# Patient Record
Sex: FEMALE | Race: WHITE | NOT HISPANIC OR LATINO
[De-identification: names, ages, dates, MRNs, and addresses within clinical notes are randomized per-mention and may not be internally consistent; named-entity substitution may affect disease eponyms.]

---

## 2019-03-12 ENCOUNTER — RECORD ABSTRACTING (OUTPATIENT)
Age: 74
End: 2019-03-12

## 2019-03-12 DIAGNOSIS — Z86.79 PERSONAL HISTORY OF OTHER DISEASES OF THE CIRCULATORY SYSTEM: ICD-10-CM

## 2019-03-12 DIAGNOSIS — Z83.438 FAMILY HISTORY OF OTHER DISORDER OF LIPOPROTEIN METABOLISM AND OTHER LIPIDEMIA: ICD-10-CM

## 2019-03-12 DIAGNOSIS — Z80.1 FAMILY HISTORY OF MALIGNANT NEOPLASM OF TRACHEA, BRONCHUS AND LUNG: ICD-10-CM

## 2019-03-12 DIAGNOSIS — Z86.39 PERSONAL HISTORY OF OTHER ENDOCRINE, NUTRITIONAL AND METABOLIC DISEASE: ICD-10-CM

## 2019-03-12 DIAGNOSIS — M50.90 CERVICAL DISC DISORDER, UNSPECIFIED, UNSPECIFIED CERVICAL REGION: ICD-10-CM

## 2019-03-12 DIAGNOSIS — Z80.49 FAMILY HISTORY OF MALIGNANT NEOPLASM OF OTHER GENITAL ORGANS: ICD-10-CM

## 2019-03-19 ENCOUNTER — APPOINTMENT (OUTPATIENT)
Dept: INTERNAL MEDICINE | Facility: CLINIC | Age: 74
End: 2019-03-19
Payer: COMMERCIAL

## 2019-03-19 VITALS
HEART RATE: 72 BPM | SYSTOLIC BLOOD PRESSURE: 146 MMHG | HEIGHT: 62.75 IN | BODY MASS INDEX: 27.81 KG/M2 | WEIGHT: 155 LBS | TEMPERATURE: 97.9 F | DIASTOLIC BLOOD PRESSURE: 70 MMHG

## 2019-03-19 DIAGNOSIS — Z00.00 ENCOUNTER FOR GENERAL ADULT MEDICAL EXAMINATION W/OUT ABNORMAL FINDINGS: ICD-10-CM

## 2019-03-19 PROCEDURE — G0444 DEPRESSION SCREEN ANNUAL: CPT

## 2019-03-19 PROCEDURE — 99204 OFFICE O/P NEW MOD 45 MIN: CPT | Mod: 25

## 2019-03-19 PROCEDURE — G0442 ANNUAL ALCOHOL SCREEN 15 MIN: CPT

## 2019-03-19 RX ORDER — ASPIRIN 325 MG/1
325 TABLET, FILM COATED ORAL
Refills: 0 | Status: DISCONTINUED | COMMUNITY
End: 2019-03-19

## 2019-03-19 NOTE — HEALTH RISK ASSESSMENT
[No falls in past year] : Patient reported no falls in the past year [Patient reported mammogram was normal] : Patient reported mammogram was normal [Patient reported PAP Smear was normal] : Patient reported PAP Smear was normal [Patient reported bone density results were abnormal] : Patient reported bone density results were abnormal [Patient declined colonoscopy] : Patient declined colonoscopy [] : No [MammogramDate] : 08/2018 [BoneDensityDate] : 2018 [PapSmearDate] : 2016 [BoneDensityComments] : osteopenia.

## 2019-03-19 NOTE — HISTORY OF PRESENT ILLNESS
[FreeTextEntry1] : numbness and tingling in feet.  [de-identified] : neuropathy diagnosed by neurologist. hands and feet.  \par \par chronic a fib. \par \par \par feel tired a lot. \par   \par \par did pt for neuropathy.   finds that has balance difficulty. \par \par black dot on great toe left foot.  \par

## 2019-04-09 ENCOUNTER — RESULT REVIEW (OUTPATIENT)
Age: 74
End: 2019-04-09

## 2019-04-23 ENCOUNTER — APPOINTMENT (OUTPATIENT)
Dept: INTERNAL MEDICINE | Facility: CLINIC | Age: 74
End: 2019-04-23

## 2019-05-15 ENCOUNTER — APPOINTMENT (OUTPATIENT)
Dept: INTERNAL MEDICINE | Facility: CLINIC | Age: 74
End: 2019-05-15
Payer: COMMERCIAL

## 2019-05-15 VITALS
DIASTOLIC BLOOD PRESSURE: 70 MMHG | SYSTOLIC BLOOD PRESSURE: 132 MMHG | HEIGHT: 63 IN | TEMPERATURE: 97.9 F | WEIGHT: 153 LBS | HEART RATE: 67 BPM | BODY MASS INDEX: 27.11 KG/M2 | RESPIRATION RATE: 16 BRPM | OXYGEN SATURATION: 96 %

## 2019-05-15 PROCEDURE — 99214 OFFICE O/P EST MOD 30 MIN: CPT

## 2019-05-15 RX ORDER — PRAVASTATIN SODIUM 40 MG/1
40 TABLET ORAL DAILY
Refills: 0 | Status: DISCONTINUED | COMMUNITY
End: 2019-05-15

## 2019-05-15 NOTE — PHYSICAL EXAM
[No Acute Distress] : no acute distress [Well Developed] : well developed [Well Nourished] : well nourished [Well-Appearing] : well-appearing [Normal Sclera/Conjunctiva] : normal sclera/conjunctiva [PERRL] : pupils equal round and reactive to light [EOMI] : extraocular movements intact [Normal Outer Ear/Nose] : the outer ears and nose were normal in appearance [No JVD] : no jugular venous distention [Normal Oropharynx] : the oropharynx was normal [Supple] : supple [No Lymphadenopathy] : no lymphadenopathy [Thyroid Normal, No Nodules] : the thyroid was normal and there were no nodules present [No Respiratory Distress] : no respiratory distress  [Clear to Auscultation] : lungs were clear to auscultation bilaterally [Normal Rate] : normal rate  [No Accessory Muscle Use] : no accessory muscle use [Normal S1, S2] : normal S1 and S2 [Regular Rhythm] : with a regular rhythm [No Murmur] : no murmur heard [No Carotid Bruits] : no carotid bruits [No Abdominal Bruit] : a ~M bruit was not heard ~T in the abdomen [No Varicosities] : no varicosities [Pedal Pulses Present] : the pedal pulses are present [No Edema] : there was no peripheral edema [No Extremity Clubbing/Cyanosis] : no extremity clubbing/cyanosis [No Palpable Aorta] : no palpable aorta [Non Tender] : non-tender [Non-distended] : non-distended [Soft] : abdomen soft [No HSM] : no HSM [No Masses] : no abdominal mass palpated [Normal Bowel Sounds] : normal bowel sounds [Normal Posterior Cervical Nodes] : no posterior cervical lymphadenopathy [Normal Anterior Cervical Nodes] : no anterior cervical lymphadenopathy [No Spinal Tenderness] : no spinal tenderness [No Joint Swelling] : no joint swelling [No CVA Tenderness] : no CVA  tenderness [No Rash] : no rash [Grossly Normal Strength/Tone] : grossly normal strength/tone [Coordination Grossly Intact] : coordination grossly intact [No Focal Deficits] : no focal deficits [Normal Gait] : normal gait [Deep Tendon Reflexes (DTR)] : deep tendon reflexes were 2+ and symmetric [Normal Affect] : the affect was normal [Normal Insight/Judgement] : insight and judgment were intact

## 2019-05-15 NOTE — HISTORY OF PRESENT ILLNESS
[FreeTextEntry1] : thyroid.  [de-identified] : us thyroid,   2 years ago. \par \par saw endocrinologist,  ordered new us. \par \par cyst on left side. \par \par has been on armour thyroid for the last 5 years.    said that levothyroxine "made her nervous.".  \par \par cardiologist just   increased pravastatin  from 20 to 40 and patient feels that "ascending paralysis"   got work. \par patient stopped pravastatin on her own accord.   \par \par cardiologist has prescribed a different statin. \par \par We reviewed her DEXA from August of 2017. She has osteopenia. Repeat DEXA August 2019. Reviewed mammogram and ultrasound the breast.

## 2019-05-20 ENCOUNTER — RESULT REVIEW (OUTPATIENT)
Age: 74
End: 2019-05-20

## 2019-05-22 ENCOUNTER — APPOINTMENT (OUTPATIENT)
Dept: CARDIOLOGY | Facility: CLINIC | Age: 74
End: 2019-05-22

## 2019-07-12 ENCOUNTER — APPOINTMENT (OUTPATIENT)
Dept: SURGERY | Facility: CLINIC | Age: 74
End: 2019-07-12

## 2019-08-02 ENCOUNTER — APPOINTMENT (OUTPATIENT)
Dept: INTERNAL MEDICINE | Facility: CLINIC | Age: 74
End: 2019-08-02
Payer: COMMERCIAL

## 2019-08-02 VITALS
HEART RATE: 86 BPM | DIASTOLIC BLOOD PRESSURE: 64 MMHG | HEIGHT: 63 IN | OXYGEN SATURATION: 98 % | SYSTOLIC BLOOD PRESSURE: 138 MMHG | TEMPERATURE: 98.1 F

## 2019-08-02 DIAGNOSIS — T50.905A ADVERSE EFFECT OF UNSPECIFIED DRUGS, MEDICAMENTS AND BIOLOGICAL SUBSTANCES, INITIAL ENCOUNTER: ICD-10-CM

## 2019-08-02 PROCEDURE — 99214 OFFICE O/P EST MOD 30 MIN: CPT

## 2019-08-02 RX ORDER — THYROID, PORCINE 15 MG/1
15 TABLET ORAL DAILY
Refills: 0 | Status: DISCONTINUED | COMMUNITY
End: 2019-08-02

## 2019-08-02 NOTE — PHYSICAL EXAM
[No Acute Distress] : no acute distress [Well Developed] : well developed [Well Nourished] : well nourished [Well-Appearing] : well-appearing [Normal Sclera/Conjunctiva] : normal sclera/conjunctiva [PERRL] : pupils equal round and reactive to light [EOMI] : extraocular movements intact [Normal Outer Ear/Nose] : the outer ears and nose were normal in appearance [No JVD] : no jugular venous distention [Normal Oropharynx] : the oropharynx was normal [Supple] : supple [No Lymphadenopathy] : no lymphadenopathy [Thyroid Normal, No Nodules] : the thyroid was normal and there were no nodules present [No Accessory Muscle Use] : no accessory muscle use [No Respiratory Distress] : no respiratory distress  [Normal Rate] : normal rate  [Clear to Auscultation] : lungs were clear to auscultation bilaterally [No Murmur] : no murmur heard [Normal S1, S2] : normal S1 and S2 [Regular Rhythm] : with a regular rhythm [No Abdominal Bruit] : a ~M bruit was not heard ~T in the abdomen [No Carotid Bruits] : no carotid bruits [Pedal Pulses Present] : the pedal pulses are present [No Varicosities] : no varicosities [No Extremity Clubbing/Cyanosis] : no extremity clubbing/cyanosis [No Edema] : there was no peripheral edema [No Palpable Aorta] : no palpable aorta [Soft] : abdomen soft [Non Tender] : non-tender [No Masses] : no abdominal mass palpated [Non-distended] : non-distended [No HSM] : no HSM [Normal Bowel Sounds] : normal bowel sounds [Normal Posterior Cervical Nodes] : no posterior cervical lymphadenopathy [No CVA Tenderness] : no CVA  tenderness [No Spinal Tenderness] : no spinal tenderness [Normal Anterior Cervical Nodes] : no anterior cervical lymphadenopathy [No Joint Swelling] : no joint swelling [Grossly Normal Strength/Tone] : grossly normal strength/tone [Coordination Grossly Intact] : coordination grossly intact [No Rash] : no rash [No Focal Deficits] : no focal deficits [Normal Gait] : normal gait [Deep Tendon Reflexes (DTR)] : deep tendon reflexes were 2+ and symmetric [Normal Insight/Judgement] : insight and judgment were intact [Normal Affect] : the affect was normal

## 2019-08-02 NOTE — HISTORY OF PRESENT ILLNESS
[FreeTextEntry1] : got 3 doses of iv ig last week.       [de-identified] : as per neurologist,      got 3 doses of ivig last week.    \par \par patient got very hot during the treatment.\par \par projectile vomiting.\par \par visiting nurse gave the treatments at home.\par \par \par took antinausea medicines.\par \par fatigue, weakness,  lightheaded,   no fever.  \par \par chills.\par \par

## 2019-08-02 NOTE — HISTORY OF PRESENT ILLNESS
[FreeTextEntry1] : got 3 doses of iv ig last week.       [de-identified] : as per neurologist,      got 3 doses of ivig last week.    \par \par patient got very hot during the treatment.\par \par projectile vomiting.\par \par visiting nurse gave the treatments at home.\par \par \par took antinausea medicines.\par \par fatigue, weakness,  lightheaded,   no fever.  \par \par chills.\par \par

## 2019-08-02 NOTE — PHYSICAL EXAM
[No Acute Distress] : no acute distress [Well Nourished] : well nourished [Well Developed] : well developed [Normal Sclera/Conjunctiva] : normal sclera/conjunctiva [Well-Appearing] : well-appearing [Normal Outer Ear/Nose] : the outer ears and nose were normal in appearance [PERRL] : pupils equal round and reactive to light [EOMI] : extraocular movements intact [No JVD] : no jugular venous distention [Normal Oropharynx] : the oropharynx was normal [Supple] : supple [No Lymphadenopathy] : no lymphadenopathy [No Respiratory Distress] : no respiratory distress  [Thyroid Normal, No Nodules] : the thyroid was normal and there were no nodules present [No Accessory Muscle Use] : no accessory muscle use [Normal Rate] : normal rate  [Clear to Auscultation] : lungs were clear to auscultation bilaterally [Normal S1, S2] : normal S1 and S2 [No Murmur] : no murmur heard [Regular Rhythm] : with a regular rhythm [No Abdominal Bruit] : a ~M bruit was not heard ~T in the abdomen [No Carotid Bruits] : no carotid bruits [No Varicosities] : no varicosities [Pedal Pulses Present] : the pedal pulses are present [No Extremity Clubbing/Cyanosis] : no extremity clubbing/cyanosis [No Edema] : there was no peripheral edema [No Palpable Aorta] : no palpable aorta [Non Tender] : non-tender [Soft] : abdomen soft [Non-distended] : non-distended [No Masses] : no abdominal mass palpated [No HSM] : no HSM [Normal Bowel Sounds] : normal bowel sounds [Normal Posterior Cervical Nodes] : no posterior cervical lymphadenopathy [Normal Anterior Cervical Nodes] : no anterior cervical lymphadenopathy [No CVA Tenderness] : no CVA  tenderness [No Spinal Tenderness] : no spinal tenderness [Grossly Normal Strength/Tone] : grossly normal strength/tone [No Joint Swelling] : no joint swelling [No Focal Deficits] : no focal deficits [No Rash] : no rash [Coordination Grossly Intact] : coordination grossly intact [Normal Gait] : normal gait [Deep Tendon Reflexes (DTR)] : deep tendon reflexes were 2+ and symmetric [Normal Insight/Judgement] : insight and judgment were intact [Normal Affect] : the affect was normal

## 2019-08-22 ENCOUNTER — TRANSCRIPTION ENCOUNTER (OUTPATIENT)
Age: 74
End: 2019-08-22

## 2019-09-16 ENCOUNTER — APPOINTMENT (OUTPATIENT)
Dept: INTERNAL MEDICINE | Facility: CLINIC | Age: 74
End: 2019-09-16
Payer: COMMERCIAL

## 2019-09-16 VITALS
HEIGHT: 63 IN | BODY MASS INDEX: 28.35 KG/M2 | DIASTOLIC BLOOD PRESSURE: 78 MMHG | OXYGEN SATURATION: 99 % | HEART RATE: 82 BPM | WEIGHT: 160 LBS | SYSTOLIC BLOOD PRESSURE: 140 MMHG

## 2019-09-16 PROCEDURE — 99214 OFFICE O/P EST MOD 30 MIN: CPT

## 2019-09-23 ENCOUNTER — APPOINTMENT (OUTPATIENT)
Dept: INTERNAL MEDICINE | Facility: CLINIC | Age: 74
End: 2019-09-23
Payer: COMMERCIAL

## 2019-09-23 VITALS
BODY MASS INDEX: 28.35 KG/M2 | OXYGEN SATURATION: 99 % | SYSTOLIC BLOOD PRESSURE: 122 MMHG | WEIGHT: 160 LBS | HEIGHT: 63 IN | HEART RATE: 66 BPM | DIASTOLIC BLOOD PRESSURE: 64 MMHG

## 2019-09-23 PROCEDURE — 99213 OFFICE O/P EST LOW 20 MIN: CPT

## 2019-09-23 RX ORDER — FUROSEMIDE 20 MG/1
20 TABLET ORAL
Qty: 30 | Refills: 0 | Status: ACTIVE | COMMUNITY
Start: 2019-06-25

## 2019-11-01 ENCOUNTER — APPOINTMENT (OUTPATIENT)
Dept: FAMILY MEDICINE | Facility: CLINIC | Age: 74
End: 2019-11-01
Payer: COMMERCIAL

## 2019-11-01 VITALS
BODY MASS INDEX: 28.35 KG/M2 | SYSTOLIC BLOOD PRESSURE: 148 MMHG | HEART RATE: 78 BPM | WEIGHT: 160 LBS | RESPIRATION RATE: 16 BRPM | OXYGEN SATURATION: 97 % | HEIGHT: 63 IN | DIASTOLIC BLOOD PRESSURE: 86 MMHG

## 2019-11-01 PROCEDURE — 99202 OFFICE O/P NEW SF 15 MIN: CPT

## 2019-11-01 NOTE — PHYSICAL EXAM
[Normal] : no acute distress, well nourished, well developed and well-appearing [de-identified] : Neck pain

## 2019-11-01 NOTE — HISTORY OF PRESENT ILLNESS
[FreeTextEntry8] : Has HD cervical spine, need MRI,\par Unable to complete the test due to anxiety\par tried with Valium given by her neuroplogist, did not work

## 2019-11-01 NOTE — HEALTH RISK ASSESSMENT
[No falls in past year] : Patient reported no falls in the past year [0] : 1) Little interest or pleasure doing things: Not at all (0) [1] : 2) Feeling down, depressed, or hopeless for several days (1) [[id number]] : 1

## 2019-11-12 ENCOUNTER — APPOINTMENT (OUTPATIENT)
Dept: INTERNAL MEDICINE | Facility: CLINIC | Age: 74
End: 2019-11-12
Payer: COMMERCIAL

## 2019-11-12 VITALS
SYSTOLIC BLOOD PRESSURE: 120 MMHG | DIASTOLIC BLOOD PRESSURE: 60 MMHG | HEIGHT: 63 IN | OXYGEN SATURATION: 98 % | WEIGHT: 160 LBS | HEART RATE: 72 BPM | BODY MASS INDEX: 28.35 KG/M2 | TEMPERATURE: 98.2 F

## 2019-11-12 DIAGNOSIS — Z86.59 PERSONAL HISTORY OF OTHER MENTAL AND BEHAVIORAL DISORDERS: ICD-10-CM

## 2019-11-12 PROCEDURE — 99214 OFFICE O/P EST MOD 30 MIN: CPT

## 2019-11-20 ENCOUNTER — CHART COPY (OUTPATIENT)
Age: 74
End: 2019-11-20

## 2019-11-25 ENCOUNTER — CHART COPY (OUTPATIENT)
Age: 74
End: 2019-11-25

## 2019-12-01 PROBLEM — Z86.59 HISTORY OF CLAUSTROPHOBIA: Status: ACTIVE | Noted: 2019-11-01

## 2019-12-10 ENCOUNTER — APPOINTMENT (OUTPATIENT)
Dept: INTERNAL MEDICINE | Facility: CLINIC | Age: 74
End: 2019-12-10
Payer: COMMERCIAL

## 2019-12-10 VITALS
HEIGHT: 63 IN | SYSTOLIC BLOOD PRESSURE: 160 MMHG | BODY MASS INDEX: 28.35 KG/M2 | OXYGEN SATURATION: 99 % | HEART RATE: 76 BPM | WEIGHT: 160 LBS | DIASTOLIC BLOOD PRESSURE: 70 MMHG

## 2019-12-10 PROCEDURE — 99213 OFFICE O/P EST LOW 20 MIN: CPT

## 2019-12-10 NOTE — PHYSICAL EXAM
[No Acute Distress] : no acute distress [No JVD] : no jugular venous distention [Well-Appearing] : well-appearing [No Lymphadenopathy] : no lymphadenopathy [Supple] : supple [No Respiratory Distress] : no respiratory distress  [No Accessory Muscle Use] : no accessory muscle use [Clear to Auscultation] : lungs were clear to auscultation bilaterally [Normal Rate] : normal rate  [Regular Rhythm] : with a regular rhythm [Normal S1, S2] : normal S1 and S2 [No Murmur] : no murmur heard [No Carotid Bruits] : no carotid bruits [No Edema] : there was no peripheral edema [Soft] : abdomen soft [No HSM] : no HSM [Non Tender] : non-tender [Normal Bowel Sounds] : normal bowel sounds

## 2019-12-10 NOTE — HISTORY OF PRESENT ILLNESS
[FreeTextEntry1] : Blood pressure check  [de-identified] : Patient is here for her Blood Pressure check.  She was started on Norvasc 2.5 mg daily on November 25, 2019.  She states that her BP at home ranges 130-140/80.  She did not bring in blood pressure record.

## 2019-12-13 ENCOUNTER — CHART COPY (OUTPATIENT)
Age: 74
End: 2019-12-13

## 2019-12-30 ENCOUNTER — APPOINTMENT (OUTPATIENT)
Dept: INTERNAL MEDICINE | Facility: CLINIC | Age: 74
End: 2019-12-30
Payer: COMMERCIAL

## 2019-12-30 VITALS
HEIGHT: 63 IN | DIASTOLIC BLOOD PRESSURE: 88 MMHG | HEART RATE: 78 BPM | SYSTOLIC BLOOD PRESSURE: 124 MMHG | WEIGHT: 160 LBS | BODY MASS INDEX: 28.35 KG/M2 | OXYGEN SATURATION: 99 %

## 2019-12-30 PROCEDURE — 99213 OFFICE O/P EST LOW 20 MIN: CPT

## 2019-12-30 NOTE — PLAN
[FreeTextEntry1] : Continue Norvasc 5 mg daily and metoprolol ER 25 mg daily\par Continue checking blood pressure at home\par Bring in blood pressure readings from home\par Follow-up in 4 weeks

## 2019-12-30 NOTE — HISTORY OF PRESENT ILLNESS
[FreeTextEntry2] : blood pressure check follow up  [FreeTextEntry1] : Blood pressure check  [de-identified] : Patient is here for her Blood Pressure check.  Currently on Norvasc 5 mg daily.  She did not bring in her blood pressure log.  Systolic blood pressure ranges in the 140 at home. No headaches or dizziness reported.\par

## 2019-12-30 NOTE — PHYSICAL EXAM
[No Acute Distress] : no acute distress [Well-Appearing] : well-appearing [PERRL] : pupils equal round and reactive to light [EOMI] : extraocular movements intact [No JVD] : no jugular venous distention [No Lymphadenopathy] : no lymphadenopathy [Supple] : supple [No Respiratory Distress] : no respiratory distress  [No Accessory Muscle Use] : no accessory muscle use [Clear to Auscultation] : lungs were clear to auscultation bilaterally [Normal Rate] : normal rate  [Regular Rhythm] : with a regular rhythm [Normal S1, S2] : normal S1 and S2 [No Murmur] : no murmur heard [No Carotid Bruits] : no carotid bruits [No Edema] : there was no peripheral edema [Soft] : abdomen soft [Non Tender] : non-tender [No HSM] : no HSM [Normal Bowel Sounds] : normal bowel sounds [de-identified] : 140/80

## 2019-12-30 NOTE — REVIEW OF SYSTEMS
[Fever] : no fever [Chest Pain] : no chest pain [Palpitations] : no palpitations [Shortness Of Breath] : no shortness of breath [Cough] : no cough [Abdominal Pain] : no abdominal pain [Headache] : no headache [Dizziness] : no dizziness [FreeTextEntry5] : she says her ankles were swollen in the past week

## 2019-12-30 NOTE — PHYSICAL EXAM
[No Acute Distress] : no acute distress [Well-Appearing] : well-appearing [PERRL] : pupils equal round and reactive to light [EOMI] : extraocular movements intact [No JVD] : no jugular venous distention [No Lymphadenopathy] : no lymphadenopathy [Supple] : supple [No Respiratory Distress] : no respiratory distress  [No Accessory Muscle Use] : no accessory muscle use [Clear to Auscultation] : lungs were clear to auscultation bilaterally [Normal Rate] : normal rate  [Regular Rhythm] : with a regular rhythm [Normal S1, S2] : normal S1 and S2 [No Murmur] : no murmur heard [No Carotid Bruits] : no carotid bruits [No Edema] : there was no peripheral edema [Soft] : abdomen soft [Non Tender] : non-tender [No HSM] : no HSM [Normal Bowel Sounds] : normal bowel sounds [de-identified] : 140/80

## 2019-12-30 NOTE — HISTORY OF PRESENT ILLNESS
[FreeTextEntry2] : blood pressure check follow up  [FreeTextEntry1] : Blood pressure check  [de-identified] : Patient is here for her Blood Pressure check.  Currently on Norvasc 5 mg daily.  She did not bring in her blood pressure log.  Systolic blood pressure ranges in the 140 at home. No headaches or dizziness reported.\par

## 2020-01-28 ENCOUNTER — APPOINTMENT (OUTPATIENT)
Dept: INTERNAL MEDICINE | Facility: CLINIC | Age: 75
End: 2020-01-28
Payer: COMMERCIAL

## 2020-01-28 VITALS
BODY MASS INDEX: 28 KG/M2 | OXYGEN SATURATION: 97 % | DIASTOLIC BLOOD PRESSURE: 70 MMHG | HEART RATE: 65 BPM | SYSTOLIC BLOOD PRESSURE: 140 MMHG | TEMPERATURE: 98.5 F | HEIGHT: 63 IN | WEIGHT: 158 LBS

## 2020-01-28 PROCEDURE — 99214 OFFICE O/P EST MOD 30 MIN: CPT

## 2020-01-29 ENCOUNTER — APPOINTMENT (OUTPATIENT)
Dept: CARDIOLOGY | Facility: CLINIC | Age: 75
End: 2020-01-29

## 2020-03-26 ENCOUNTER — RX RENEWAL (OUTPATIENT)
Age: 75
End: 2020-03-26

## 2020-07-10 ENCOUNTER — RESULT REVIEW (OUTPATIENT)
Age: 75
End: 2020-07-10

## 2020-08-07 ENCOUNTER — RX RENEWAL (OUTPATIENT)
Age: 75
End: 2020-08-07

## 2020-11-17 NOTE — REVIEW OF SYSTEMS
[Fever] : no fever [Fatigue] : no fatigue [Pain] : no pain [Redness] : no redness [Chest Pain] : no chest pain [Palpitations] : no palpitations [Leg Claudication] : no leg claudication [Lower Ext Edema] : no lower extremity edema [Shortness Of Breath] : no shortness of breath [Cough] : no cough [Abdominal Pain] : no abdominal pain [Nausea] : no nausea [Vomiting] : no vomiting [Dysuria] : no dysuria [Headache] : no headache [Dizziness] : no dizziness [de-identified] : she think she had an Anxiety while waiting to be seen; felt nervous and her heart was racing

## 2020-11-17 NOTE — PHYSICAL EXAM
[No Acute Distress] : no acute distress [Well-Appearing] : well-appearing [Normal Sclera/Conjunctiva] : normal sclera/conjunctiva [PERRL] : pupils equal round and reactive to light [EOMI] : extraocular movements intact [No JVD] : no jugular venous distention [No Lymphadenopathy] : no lymphadenopathy [Supple] : supple [No Respiratory Distress] : no respiratory distress  [No Accessory Muscle Use] : no accessory muscle use [Clear to Auscultation] : lungs were clear to auscultation bilaterally [Normal Rate] : normal rate  [Regular Rhythm] : with a regular rhythm [Normal S1, S2] : normal S1 and S2 [No Murmur] : no murmur heard [No Carotid Bruits] : no carotid bruits [Pedal Pulses Present] : the pedal pulses are present [No Edema] : there was no peripheral edema [Soft] : abdomen soft [Non Tender] : non-tender [No HSM] : no HSM [Normal Bowel Sounds] : normal bowel sounds [de-identified] : 140/80

## 2020-11-17 NOTE — HISTORY OF PRESENT ILLNESS
[FreeTextEntry1] : Blood Pressure follow up [de-identified] : She has been checking her Blood Pressure at home; readings reviewed.  Systolic BP at home in the 140-150s.  Copy of Blood Pressure Readings will be scanned to patient's chart.  She denies chest pain, shortness of breath. She stopped Norvasc because her Ankle was swollen.\par Other: she was referred to Physical Therapy for Chronic inflammatory peripheral demyelinating neuropathy.  She did not schedule appointment.

## 2021-01-29 ENCOUNTER — APPOINTMENT (OUTPATIENT)
Dept: INTERNAL MEDICINE | Facility: CLINIC | Age: 76
End: 2021-01-29

## 2021-02-11 ENCOUNTER — APPOINTMENT (OUTPATIENT)
Dept: INTERNAL MEDICINE | Facility: CLINIC | Age: 76
End: 2021-02-11
Payer: COMMERCIAL

## 2021-02-11 VITALS
SYSTOLIC BLOOD PRESSURE: 160 MMHG | DIASTOLIC BLOOD PRESSURE: 90 MMHG | OXYGEN SATURATION: 100 % | HEART RATE: 76 BPM | TEMPERATURE: 98.6 F | WEIGHT: 168 LBS | BODY MASS INDEX: 29.77 KG/M2 | HEIGHT: 63 IN

## 2021-02-11 DIAGNOSIS — I48.20 CHRONIC ATRIAL FIBRILLATION, UNSP: ICD-10-CM

## 2021-02-11 DIAGNOSIS — G62.9 POLYNEUROPATHY, UNSPECIFIED: ICD-10-CM

## 2021-02-11 PROCEDURE — 99213 OFFICE O/P EST LOW 20 MIN: CPT

## 2021-02-11 PROCEDURE — 99072 ADDL SUPL MATRL&STAF TM PHE: CPT

## 2021-02-11 RX ORDER — DIAZEPAM 5 MG/1
5 TABLET ORAL
Qty: 1 | Refills: 0 | Status: DISCONTINUED | COMMUNITY
Start: 2019-10-31 | End: 2021-02-11

## 2021-02-11 RX ORDER — HYDROCHLOROTHIAZIDE 12.5 MG/1
12.5 TABLET ORAL
Qty: 30 | Refills: 3 | Status: DISCONTINUED | COMMUNITY
Start: 2020-01-28 | End: 2021-02-11

## 2021-02-14 PROBLEM — G62.9 NEUROPATHY: Status: ACTIVE | Noted: 2019-03-19

## 2021-02-14 PROBLEM — I48.20 CHRONIC ATRIAL FIBRILLATION: Status: RESOLVED | Noted: 2019-03-19 | Resolved: 2021-02-14

## 2021-02-14 NOTE — HISTORY OF PRESENT ILLNESS
[FreeTextEntry1] : HTN Follow up [de-identified] : Last seen 1/28/2020\par She has a h/o HTN, Atrial Fibrillation, Breast Cancer, Hypothyroidism, Chronic Inflammatory Peripheral Demyelinating Polyneuropathy.  \par She says her Systolic BP is in the 130's at home.\par She was treated with but has not noted improvement in leg weakness.\par She is now seeing a new Neurologist at [facility]: Stefany Vance\par She continues to have weakness in her legs; L>R.  She s also c/o discoloration of her legs/toes, and cold feet.\par

## 2021-02-14 NOTE — REVIEW OF SYSTEMS
[Lower Ext Edema] : lower extremity edema [Unsteady Walking] : ataxia [Fever] : no fever [Earache] : no earache [Palpitations] : no palpitations [Chest Pain] : no chest pain [Shortness Of Breath] : no shortness of breath [Wheezing] : no wheezing [Cough] : no cough [Abdominal Pain] : no abdominal pain [Headache] : no headache [Dizziness] : no dizziness [FreeTextEntry3] : last eye exam 2/2020 [FreeTextEntry1] : Vascular: cold feet; discolored legs/feet

## 2021-02-14 NOTE — PHYSICAL EXAM
[No Acute Distress] : no acute distress [No Lymphadenopathy] : no lymphadenopathy [No JVD] : no jugular venous distention [No Respiratory Distress] : no respiratory distress  [Clear to Auscultation] : lungs were clear to auscultation bilaterally [Normal Rate] : normal rate  [Regular Rhythm] : with a regular rhythm [Normal S1, S2] : normal S1 and S2 [Pedal Pulses Present] : the pedal pulses are present [Soft] : abdomen soft [Non Tender] : non-tender [Normal Supraclavicular Nodes] : no supraclavicular lymphadenopathy [Normal Anterior Cervical Nodes] : no anterior cervical lymphadenopathy [de-identified] : Feet: skin cool to the touch;  [de-identified] : Cold feet/toes; reddish bluish discoloration of legs/feet [de-identified] : Skin on feet: has a reddish, bluish hue [de-identified] : weakness in muscles in lower legs. Steppage Gait Bilaterally 2nd to bilateral foot drop [de-identified] : FEE

## 2021-03-10 ENCOUNTER — APPOINTMENT (OUTPATIENT)
Dept: VASCULAR SURGERY | Facility: CLINIC | Age: 76
End: 2021-03-10
Payer: COMMERCIAL

## 2021-03-10 DIAGNOSIS — R68.89 OTHER GENERAL SYMPTOMS AND SIGNS: ICD-10-CM

## 2021-03-10 PROCEDURE — 93970 EXTREMITY STUDY: CPT

## 2021-03-10 PROCEDURE — 99243 OFF/OP CNSLTJ NEW/EST LOW 30: CPT

## 2021-03-10 PROCEDURE — 99072 ADDL SUPL MATRL&STAF TM PHE: CPT

## 2021-03-11 NOTE — ASSESSMENT
[FreeTextEntry1] : 76 yo female with blue cold feet bilaterally. She has no evidence of arterial insufficiency on physical exam. She has palpable DP pulses bilaterally. She underwent a venous duplex which demonstrated no DVT or SVT.  She has mild insufficiency. I believe that her blue feet are multifactorial. There maybe a component of venous insufficiency. She may also have a neurologic component. There is no vascular intervention necessary at this time. I recommended that she consider wearing an ankle compression sleeve for her left ankle edema.\par \par Follow up on a PRN basis.

## 2021-03-11 NOTE — HISTORY OF PRESENT ILLNESS
[FreeTextEntry1] : 74 yo female referred by Dr. Ewing for cold discolored feet. She reports that she was seen by Dr. Ewing approximately 1 month ago. Her feet were noted to be blue and cold. The patient cannot recall how long this has been happening. She also reports that she has worsening edema of bilateral lower extremities left > right. She does not currently wear compression stockings. She has a history of Raynaud's. She denies a history of DVT or SVT.

## 2021-03-11 NOTE — PHYSICAL EXAM
[Normal Breath Sounds] : Normal breath sounds [Normal Rate and Rhythm] : normal rate and rhythm [2+] : left 2+ [Ankle Swelling (On Exam)] : present [Ankle Swelling Bilaterally] : bilaterally  [Ankle Swelling On The Left] : moderate [Varicose Veins Of Lower Extremities] : bilaterally [Ankle Swelling On The Right] : mild [Alert] : alert [Oriented to Person] : oriented to person [Oriented to Place] : oriented to place [Oriented to Time] : oriented to time [JVD] : no jugular venous distention  [] : not present [Skin Ulcer] : no ulcer [de-identified] : Awake and Alert [de-identified] : bilateral feet cool, bluish discoloration [de-identified] : Appropriate

## 2021-03-25 ENCOUNTER — NON-APPOINTMENT (OUTPATIENT)
Age: 76
End: 2021-03-25

## 2021-03-26 ENCOUNTER — APPOINTMENT (OUTPATIENT)
Dept: INTERNAL MEDICINE | Facility: CLINIC | Age: 76
End: 2021-03-26
Payer: COMMERCIAL

## 2021-03-26 VITALS
OXYGEN SATURATION: 97 % | TEMPERATURE: 98 F | BODY MASS INDEX: 29.05 KG/M2 | HEART RATE: 71 BPM | WEIGHT: 164 LBS | DIASTOLIC BLOOD PRESSURE: 86 MMHG | SYSTOLIC BLOOD PRESSURE: 170 MMHG

## 2021-03-26 DIAGNOSIS — Z12.11 ENCOUNTER FOR SCREENING FOR MALIGNANT NEOPLASM OF COLON: ICD-10-CM

## 2021-03-26 PROCEDURE — 99072 ADDL SUPL MATRL&STAF TM PHE: CPT

## 2021-03-26 PROCEDURE — 99213 OFFICE O/P EST LOW 20 MIN: CPT

## 2021-04-02 PROBLEM — Z12.11 SCREENING FOR COLON CANCER: Status: ACTIVE | Noted: 2021-03-26

## 2021-04-02 NOTE — REVIEW OF SYSTEMS
[Unsteady Walking] : ataxia [Anxiety] : anxiety [Fever] : no fever [Chills] : no chills [Chest Pain] : no chest pain [Palpitations] : no palpitations [Cough] : no cough [Shortness Of Breath] : no shortness of breath [Headache] : no headache [Dizziness] : no dizziness [FreeTextEntry5] : chronic lower ext swelling [de-identified] : c/o weakness in legs

## 2021-04-02 NOTE — PHYSICAL EXAM
[PERRL] : pupils equal round and reactive to light [EOMI] : extraocular movements intact [No JVD] : no jugular venous distention [No Lymphadenopathy] : no lymphadenopathy [No Respiratory Distress] : no respiratory distress  [Clear to Auscultation] : lungs were clear to auscultation bilaterally [Normal Rate] : normal rate  [Regular Rhythm] : with a regular rhythm [Normal S1, S2] : normal S1 and S2 [No Murmur] : no murmur heard [de-identified] : Repeat BP Manual 150/80; with Automatic Machine from home [id number] [de-identified] : colds feet and toes; bluish discoloration [de-identified] : bilateral foot drop; Steppage Gait; weakness in muscles in lower legs

## 2021-04-02 NOTE — HISTORY OF PRESENT ILLNESS
[FreeTextEntry1] : Blood Pressure  [de-identified] : Patient is here for BP Follow up.\par She was seen by Dr. Simon, Neuromuscular Specialist whom she is seeing for her Chronic Inflammatory  Peripheral Demyelinating Polyneuropathy.  Blood Pressure there was 182/102.  She admits to feeling Anxious.  She denies chest pain, shortness of breath.\par Today BP at Home 149/63  HR 73\par DEXA 7/2020: Osteopenia

## 2021-04-11 ENCOUNTER — LABORATORY RESULT (OUTPATIENT)
Age: 76
End: 2021-04-11

## 2021-04-14 ENCOUNTER — APPOINTMENT (OUTPATIENT)
Dept: CARDIOLOGY | Facility: CLINIC | Age: 76
End: 2021-04-14
Payer: COMMERCIAL

## 2021-04-14 VITALS — SYSTOLIC BLOOD PRESSURE: 144 MMHG | DIASTOLIC BLOOD PRESSURE: 80 MMHG

## 2021-04-14 VITALS
HEIGHT: 63 IN | SYSTOLIC BLOOD PRESSURE: 160 MMHG | WEIGHT: 150 LBS | BODY MASS INDEX: 26.58 KG/M2 | DIASTOLIC BLOOD PRESSURE: 70 MMHG | TEMPERATURE: 97.4 F

## 2021-04-14 PROCEDURE — 99204 OFFICE O/P NEW MOD 45 MIN: CPT

## 2021-04-14 PROCEDURE — 99072 ADDL SUPL MATRL&STAF TM PHE: CPT

## 2021-10-05 ENCOUNTER — TRANSCRIPTION ENCOUNTER (OUTPATIENT)
Age: 76
End: 2021-10-05

## 2021-10-05 DIAGNOSIS — R92.2 INCONCLUSIVE MAMMOGRAM: ICD-10-CM

## 2021-10-05 DIAGNOSIS — Z12.31 ENCOUNTER FOR SCREENING MAMMOGRAM FOR MALIGNANT NEOPLASM OF BREAST: ICD-10-CM

## 2021-10-27 ENCOUNTER — RESULT REVIEW (OUTPATIENT)
Age: 76
End: 2021-10-27

## 2021-11-01 ENCOUNTER — NON-APPOINTMENT (OUTPATIENT)
Age: 76
End: 2021-11-01

## 2021-12-09 ENCOUNTER — APPOINTMENT (OUTPATIENT)
Dept: SURGERY | Facility: CLINIC | Age: 76
End: 2021-12-09

## 2021-12-17 ENCOUNTER — APPOINTMENT (OUTPATIENT)
Dept: INTERNAL MEDICINE | Facility: CLINIC | Age: 76
End: 2021-12-17

## 2022-01-05 ENCOUNTER — TRANSCRIPTION ENCOUNTER (OUTPATIENT)
Age: 77
End: 2022-01-05

## 2022-04-25 ENCOUNTER — RX RENEWAL (OUTPATIENT)
Age: 77
End: 2022-04-25

## 2022-06-27 ENCOUNTER — APPOINTMENT (OUTPATIENT)
Dept: CARDIOLOGY | Facility: CLINIC | Age: 77
End: 2022-06-27

## 2022-07-26 ENCOUNTER — RX RENEWAL (OUTPATIENT)
Age: 77
End: 2022-07-26

## 2022-07-27 ENCOUNTER — RX RENEWAL (OUTPATIENT)
Age: 77
End: 2022-07-27

## 2022-08-10 ENCOUNTER — APPOINTMENT (OUTPATIENT)
Dept: CARDIOLOGY | Facility: CLINIC | Age: 77
End: 2022-08-10

## 2022-08-10 ENCOUNTER — NON-APPOINTMENT (OUTPATIENT)
Age: 77
End: 2022-08-10

## 2022-08-10 VITALS
DIASTOLIC BLOOD PRESSURE: 70 MMHG | WEIGHT: 150 LBS | HEART RATE: 76 BPM | OXYGEN SATURATION: 98 % | HEIGHT: 63 IN | SYSTOLIC BLOOD PRESSURE: 126 MMHG | BODY MASS INDEX: 26.58 KG/M2

## 2022-08-10 PROCEDURE — 93000 ELECTROCARDIOGRAM COMPLETE: CPT

## 2022-08-10 PROCEDURE — 99214 OFFICE O/P EST MOD 30 MIN: CPT | Mod: 25

## 2022-12-12 ENCOUNTER — NON-APPOINTMENT (OUTPATIENT)
Age: 77
End: 2022-12-12

## 2022-12-12 DIAGNOSIS — U07.1 COVID-19: ICD-10-CM

## 2022-12-16 ENCOUNTER — NON-APPOINTMENT (OUTPATIENT)
Age: 77
End: 2022-12-16

## 2022-12-23 ENCOUNTER — RX RENEWAL (OUTPATIENT)
Age: 77
End: 2022-12-23

## 2023-04-17 ENCOUNTER — RESULT REVIEW (OUTPATIENT)
Age: 78
End: 2023-04-17

## 2023-04-19 ENCOUNTER — NON-APPOINTMENT (OUTPATIENT)
Age: 78
End: 2023-04-19

## 2023-05-01 ENCOUNTER — RESULT REVIEW (OUTPATIENT)
Age: 78
End: 2023-05-01

## 2023-05-04 ENCOUNTER — NON-APPOINTMENT (OUTPATIENT)
Age: 78
End: 2023-05-04

## 2023-05-04 ENCOUNTER — APPOINTMENT (OUTPATIENT)
Dept: SURGERY | Facility: CLINIC | Age: 78
End: 2023-05-04
Payer: COMMERCIAL

## 2023-05-04 VITALS
HEART RATE: 68 BPM | HEIGHT: 63 IN | WEIGHT: 150 LBS | SYSTOLIC BLOOD PRESSURE: 183 MMHG | BODY MASS INDEX: 26.58 KG/M2 | DIASTOLIC BLOOD PRESSURE: 86 MMHG | OXYGEN SATURATION: 98 % | TEMPERATURE: 98.1 F

## 2023-05-04 DIAGNOSIS — Z85.3 PERSONAL HISTORY OF MALIGNANT NEOPLASM OF BREAST: ICD-10-CM

## 2023-05-04 PROCEDURE — 99213 OFFICE O/P EST LOW 20 MIN: CPT

## 2023-05-26 ENCOUNTER — RESULT REVIEW (OUTPATIENT)
Age: 78
End: 2023-05-26

## 2023-05-31 DIAGNOSIS — R92.1 MAMMOGRAPHIC CALCIFICATION FOUND ON DIAGNOSTIC IMAGING OF BREAST: ICD-10-CM

## 2023-05-31 DIAGNOSIS — N60.91 UNSPECIFIED BENIGN MAMMARY DYSPLASIA OF RIGHT BREAST: ICD-10-CM

## 2023-06-01 ENCOUNTER — NON-APPOINTMENT (OUTPATIENT)
Age: 78
End: 2023-06-01

## 2023-07-17 ENCOUNTER — TRANSCRIPTION ENCOUNTER (OUTPATIENT)
Age: 78
End: 2023-07-17

## 2023-08-31 ENCOUNTER — NON-APPOINTMENT (OUTPATIENT)
Age: 78
End: 2023-08-31

## 2023-09-08 ENCOUNTER — APPOINTMENT (OUTPATIENT)
Dept: INTERNAL MEDICINE | Facility: CLINIC | Age: 78
End: 2023-09-08
Payer: COMMERCIAL

## 2023-09-08 VITALS
SYSTOLIC BLOOD PRESSURE: 134 MMHG | RESPIRATION RATE: 15 BRPM | HEIGHT: 63 IN | HEART RATE: 79 BPM | OXYGEN SATURATION: 97 % | BODY MASS INDEX: 29.23 KG/M2 | DIASTOLIC BLOOD PRESSURE: 80 MMHG | TEMPERATURE: 98 F | WEIGHT: 165 LBS

## 2023-09-08 PROCEDURE — 36415 COLL VENOUS BLD VENIPUNCTURE: CPT

## 2023-09-08 PROCEDURE — 99214 OFFICE O/P EST MOD 30 MIN: CPT | Mod: 25

## 2023-09-08 NOTE — HISTORY OF PRESENT ILLNESS
[de-identified] : Patient is here for follow up. She was diagnosed with DM 6 months ago. She was started on Metformin.  She does not remember the dose of the medication. Last Eye exam 2 years ago. Fasting BS this 132.

## 2023-09-12 ENCOUNTER — TRANSCRIPTION ENCOUNTER (OUTPATIENT)
Age: 78
End: 2023-09-12

## 2023-09-18 ENCOUNTER — RX RENEWAL (OUTPATIENT)
Age: 78
End: 2023-09-18

## 2023-09-27 ENCOUNTER — APPOINTMENT (OUTPATIENT)
Dept: CARDIOLOGY | Facility: CLINIC | Age: 78
End: 2023-09-27
Payer: COMMERCIAL

## 2023-09-27 ENCOUNTER — NON-APPOINTMENT (OUTPATIENT)
Age: 78
End: 2023-09-27

## 2023-09-27 VITALS
HEART RATE: 68 BPM | SYSTOLIC BLOOD PRESSURE: 120 MMHG | DIASTOLIC BLOOD PRESSURE: 80 MMHG | OXYGEN SATURATION: 98 % | HEIGHT: 63 IN | BODY MASS INDEX: 29.23 KG/M2 | WEIGHT: 165 LBS

## 2023-09-27 PROCEDURE — 99215 OFFICE O/P EST HI 40 MIN: CPT | Mod: 25

## 2023-09-27 PROCEDURE — 93000 ELECTROCARDIOGRAM COMPLETE: CPT

## 2023-10-01 RX ORDER — METOPROLOL SUCCINATE 50 MG/1
50 TABLET, EXTENDED RELEASE ORAL DAILY
Qty: 90 | Refills: 3 | Status: ACTIVE | COMMUNITY
Start: 2019-09-16 | End: 1900-01-01

## 2023-10-05 ENCOUNTER — RX RENEWAL (OUTPATIENT)
Age: 78
End: 2023-10-05

## 2023-11-14 ENCOUNTER — APPOINTMENT (OUTPATIENT)
Dept: INTERNAL MEDICINE | Facility: CLINIC | Age: 78
End: 2023-11-14
Payer: COMMERCIAL

## 2023-11-14 VITALS
BODY MASS INDEX: 29.06 KG/M2 | TEMPERATURE: 98.2 F | SYSTOLIC BLOOD PRESSURE: 110 MMHG | HEIGHT: 63 IN | WEIGHT: 164 LBS | DIASTOLIC BLOOD PRESSURE: 78 MMHG | OXYGEN SATURATION: 97 % | RESPIRATION RATE: 16 BRPM | HEART RATE: 72 BPM

## 2023-11-14 DIAGNOSIS — Z00.00 ENCOUNTER FOR GENERAL ADULT MEDICAL EXAMINATION W/OUT ABNORMAL FINDINGS: ICD-10-CM

## 2023-11-14 DIAGNOSIS — B35.3 TINEA PEDIS: ICD-10-CM

## 2023-11-14 DIAGNOSIS — R60.0 LOCALIZED EDEMA: ICD-10-CM

## 2023-11-14 DIAGNOSIS — Z92.29 PERSONAL HISTORY OF OTHER DRUG THERAPY: ICD-10-CM

## 2023-11-14 DIAGNOSIS — R19.5 OTHER FECAL ABNORMALITIES: ICD-10-CM

## 2023-11-14 DIAGNOSIS — F41.9 ANXIETY DISORDER, UNSPECIFIED: ICD-10-CM

## 2023-11-14 DIAGNOSIS — I48.0 PAROXYSMAL ATRIAL FIBRILLATION: ICD-10-CM

## 2023-11-14 PROCEDURE — G0444 DEPRESSION SCREEN ANNUAL: CPT

## 2023-11-14 PROCEDURE — G0439: CPT

## 2023-11-14 PROCEDURE — 36415 COLL VENOUS BLD VENIPUNCTURE: CPT

## 2023-11-14 PROCEDURE — 99214 OFFICE O/P EST MOD 30 MIN: CPT | Mod: 25

## 2023-11-15 PROBLEM — F41.9 ANXIETY DISORDER: Status: ACTIVE | Noted: 2019-11-01

## 2023-11-15 PROBLEM — B35.3 TINEA PEDIS OF BOTH FEET: Status: ACTIVE | Noted: 2023-11-15

## 2023-11-15 PROBLEM — R19.5 POSITIVE COLORECTAL CANCER SCREENING USING DNA-BASED STOOL TEST: Status: ACTIVE | Noted: 2021-04-19

## 2023-11-15 PROBLEM — Z00.00 MEDICARE ANNUAL WELLNESS VISIT, SUBSEQUENT: Status: ACTIVE | Noted: 2023-11-14

## 2023-11-25 PROBLEM — Z92.29 HAS RECEIVED PNEUMOCOCCAL VACCINATION: Status: ACTIVE | Noted: 2023-11-25

## 2023-11-25 PROBLEM — R60.0 BILATERAL EDEMA OF LOWER EXTREMITY: Status: ACTIVE | Noted: 2021-03-10

## 2023-11-25 PROBLEM — I48.0 PAROXYSMAL ATRIAL FIBRILLATION: Status: ACTIVE | Noted: 2021-04-14

## 2023-11-25 LAB
ALBUMIN SERPL ELPH-MCNC: 4.2 G/DL
ALBUMIN SERPL ELPH-MCNC: 4.3 G/DL
ALP BLD-CCNC: 101 U/L
ALP BLD-CCNC: 83 U/L
ALT SERPL-CCNC: 10 U/L
ALT SERPL-CCNC: 14 U/L
ANION GAP SERPL CALC-SCNC: 13 MMOL/L
ANION GAP SERPL CALC-SCNC: 15 MMOL/L
APPEARANCE: CLEAR
AST SERPL-CCNC: 13 U/L
AST SERPL-CCNC: 20 U/L
BACTERIA: NEGATIVE /HPF
BASOPHILS # BLD AUTO: 0.03 K/UL
BASOPHILS NFR BLD AUTO: 0.3 %
BILIRUB SERPL-MCNC: 0.3 MG/DL
BILIRUB SERPL-MCNC: 0.3 MG/DL
BILIRUBIN URINE: NEGATIVE
BLOOD URINE: NEGATIVE
BUN SERPL-MCNC: 13 MG/DL
BUN SERPL-MCNC: 17 MG/DL
CALCIUM SERPL-MCNC: 10.1 MG/DL
CALCIUM SERPL-MCNC: 9.8 MG/DL
CAST: 0 /LPF
CHLORIDE SERPL-SCNC: 103 MMOL/L
CHLORIDE SERPL-SCNC: 103 MMOL/L
CHOLEST SERPL-MCNC: 192 MG/DL
CO2 SERPL-SCNC: 22 MMOL/L
CO2 SERPL-SCNC: 22 MMOL/L
COLOR: YELLOW
CREAT SERPL-MCNC: 0.66 MG/DL
CREAT SERPL-MCNC: 0.71 MG/DL
CREAT SPEC-SCNC: 50 MG/DL
EGFR: 88 ML/MIN/1.73M2
EGFR: 91 ML/MIN/1.73M2
EOSINOPHIL # BLD AUTO: 0.1 K/UL
EOSINOPHIL NFR BLD AUTO: 1.2 %
EPITHELIAL CELLS: 1 /HPF
ESTIMATED AVERAGE GLUCOSE: 134 MG/DL
ESTIMATED AVERAGE GLUCOSE: 134 MG/DL
GLUCOSE QUALITATIVE U: NEGATIVE MG/DL
GLUCOSE SERPL-MCNC: 104 MG/DL
GLUCOSE SERPL-MCNC: 96 MG/DL
HBA1C MFR BLD HPLC: 6.3 %
HBA1C MFR BLD HPLC: 6.3 %
HCT VFR BLD CALC: 44.2 %
HDLC SERPL-MCNC: 60 MG/DL
HGB BLD-MCNC: 14 G/DL
IMM GRANULOCYTES NFR BLD AUTO: 0.5 %
KETONES URINE: NEGATIVE MG/DL
LDLC SERPL CALC-MCNC: 101 MG/DL
LEUKOCYTE ESTERASE URINE: ABNORMAL
LYMPHOCYTES # BLD AUTO: 2.61 K/UL
LYMPHOCYTES NFR BLD AUTO: 30.1 %
MAN DIFF?: NORMAL
MCHC RBC-ENTMCNC: 26.8 PG
MCHC RBC-ENTMCNC: 31.7 GM/DL
MCV RBC AUTO: 84.7 FL
MICROALBUMIN 24H UR DL<=1MG/L-MCNC: 1.4 MG/DL
MICROALBUMIN/CREAT 24H UR-RTO: 29 MG/G
MICROSCOPIC-UA: NORMAL
MONOCYTES # BLD AUTO: 0.65 K/UL
MONOCYTES NFR BLD AUTO: 7.5 %
NEUTROPHILS # BLD AUTO: 5.25 K/UL
NEUTROPHILS NFR BLD AUTO: 60.4 %
NITRITE URINE: NEGATIVE
NONHDLC SERPL-MCNC: 132 MG/DL
PH URINE: 6
PLATELET # BLD AUTO: 217 K/UL
POTASSIUM SERPL-SCNC: 4.5 MMOL/L
POTASSIUM SERPL-SCNC: 4.7 MMOL/L
PROT SERPL-MCNC: 6.6 G/DL
PROT SERPL-MCNC: 6.9 G/DL
PROTEIN URINE: NEGATIVE MG/DL
RBC # BLD: 5.22 M/UL
RBC # FLD: 13.1 %
RED BLOOD CELLS URINE: 2 /HPF
SODIUM SERPL-SCNC: 138 MMOL/L
SODIUM SERPL-SCNC: 140 MMOL/L
SPECIFIC GRAVITY URINE: 1.01
T4 FREE SERPL-MCNC: 1 NG/DL
T4 FREE SERPL-MCNC: 1.2 NG/DL
TRIGL SERPL-MCNC: 176 MG/DL
TSH SERPL-ACNC: 1.13 UIU/ML
TSH SERPL-ACNC: 1.49 UIU/ML
UROBILINOGEN URINE: 0.2 MG/DL
WBC # FLD AUTO: 8.68 K/UL
WHITE BLOOD CELLS URINE: 89 /HPF

## 2023-12-14 ENCOUNTER — APPOINTMENT (OUTPATIENT)
Dept: SURGERY | Facility: CLINIC | Age: 78
End: 2023-12-14

## 2024-01-09 ENCOUNTER — APPOINTMENT (OUTPATIENT)
Dept: ENDOCRINOLOGY | Facility: CLINIC | Age: 79
End: 2024-01-09

## 2024-01-29 ENCOUNTER — APPOINTMENT (OUTPATIENT)
Dept: ENDOCRINOLOGY | Facility: CLINIC | Age: 79
End: 2024-01-29
Payer: COMMERCIAL

## 2024-01-29 VITALS
SYSTOLIC BLOOD PRESSURE: 162 MMHG | DIASTOLIC BLOOD PRESSURE: 83 MMHG | BODY MASS INDEX: 27.46 KG/M2 | WEIGHT: 155 LBS | OXYGEN SATURATION: 98 % | HEIGHT: 63 IN | HEART RATE: 73 BPM

## 2024-01-29 DIAGNOSIS — I10 ESSENTIAL (PRIMARY) HYPERTENSION: ICD-10-CM

## 2024-01-29 DIAGNOSIS — M85.80 OTHER SPECIFIED DISORDERS OF BONE DENSITY AND STRUCTURE, UNSPECIFIED SITE: ICD-10-CM

## 2024-01-29 LAB — GLUCOSE BLDC GLUCOMTR-MCNC: 110

## 2024-01-29 PROCEDURE — 99204 OFFICE O/P NEW MOD 45 MIN: CPT

## 2024-01-29 PROCEDURE — G2211 COMPLEX E/M VISIT ADD ON: CPT

## 2024-01-29 PROCEDURE — 82962 GLUCOSE BLOOD TEST: CPT

## 2024-01-29 NOTE — PHYSICAL EXAM
[Alert] : alert [Well Nourished] : well nourished [No Acute Distress] : no acute distress [Well Developed] : well developed [Normal Voice/Communication] : normal voice communication [EOMI] : extra ocular movement intact [Normal Hearing] : hearing was normal [No Respiratory Distress] : no respiratory distress [Normal Rate and Effort] : normal respiratory rate and effort [Normal Rate] : heart rate was normal [Regular Rhythm] : with a regular rhythm [Not Distended] : not distended [No Stigmata of Cushings Syndrome] : no stigmata of Cushings Syndrome [Delayed in the Right Toes] : delayed in the toes [Full ROM] : with full range of motion [Swelling] : swollen [Delayed in the Left Toes] : delayed in the toes [0] : 0 in the dorsalis pedis [Normal Sensation on Monofilament Testing] : normal sensation on monofilament testing of lower extremities [Oriented x3] : oriented to person, place, and time [Normal Affect] : the affect was normal [Recent Memory Normal] : recent memory was not impaired [Normal Insight/Judgement] : insight and judgment were intact [Normal Mood] : the mood was normal [Remote Memory Normal] : remote memory was not impaired [Acanthosis Nigricans] : no acanthosis nigricans [Foot Ulcers] : no foot ulcers [Tenderness] : not tender [Erythema] : not erythematous [de-identified] : Venous stasis changes of her legs bilaterally.  Decreased pedal pulses bilaterally.  Cold feet bilaterally. [de-identified] : Ambulating with cane

## 2024-01-29 NOTE — REASON FOR VISIT
[Initial Evaluation] : an initial evaluation [DM Type 2] : DM Type 2 [Source: ______] : History obtained from [facility] [FreeTextEntry2] : Dr. Aundrea Ewing

## 2024-01-29 NOTE — HISTORY OF PRESENT ILLNESS
[FreeTextEntry1] : DM diagnosed in 2023, uncomplicated Patient has been following lifestyle modifications for diabetes mellitus since her diagnosis. Not taking the metformin prescribed by prior endocrinologist due to GI intolerance. Therapies tried: Metformin Last HgA1c in November 2023 was 6.3% Checks BS every morning; maximum blood glucose of 160 mg/dL Hypoglycemia: Denies   Microvascular complications Last albumin/creatinine ratio: November 2023; normal Neuropathy symptoms: Yes, but due to CIDP Microfilament exam: Abnormal; 20241 Retinopathy: Denies Last eye exam: Due this year   Macrovascular complications CAD/CVA/PVD: Denies   HTN: Yes, currently taking metoprolol 50 mg daily LDL at goal ( < 70 mg/dL) while taking pravastatin 40 mg daily   Diet: Mindful Exercise: Limited by CIDP Immunizations: Up-to-date Family DM History: Denies  Osteopenia diagnosed by DEXA scan Last DEXA scan: April 2023; worst T-score -2.4 in the left femoral neck, -1.8 in left hip, -1.7 in the spine History of fractures: Denies Age at menopause: Early 50s Height loss: Denies History of hypercalcemia: Denies History of kidney stones: Denies Problems with balance or vision: Yes, difficulty ambulating due to CIDP History of falls: Denies Smoking history: Quit > 40 years ago Prior glucocorticoid use: Yes, received 750 mg IV methylprednisolone twice weekly for 6 months Prior anti-epileptic medications: Denies Prior chemotherapy: Denies Prior radiation therapy: Yes, left breast cancer Heartburn or reflux symptoms: Denies Family history of fractures, osteoporosis, or hyperparathyroidism: Grandmother and mother had multiple fractures. Calcium and Vitamin D through diet and supplementation: Vitamin D supplement Previous osteoporosis treatments: No

## 2024-02-05 ENCOUNTER — APPOINTMENT (OUTPATIENT)
Dept: ENDOCRINOLOGY | Facility: CLINIC | Age: 79
End: 2024-02-05

## 2024-02-06 ENCOUNTER — LABORATORY RESULT (OUTPATIENT)
Age: 79
End: 2024-02-06

## 2024-02-21 RX ORDER — APIXABAN 5 MG/1
5 TABLET, FILM COATED ORAL
Qty: 180 | Refills: 0 | Status: ACTIVE | COMMUNITY
Start: 1900-01-01 | End: 1900-01-01

## 2024-03-19 RX ORDER — PRAVASTATIN SODIUM 40 MG/1
40 TABLET ORAL
Qty: 90 | Refills: 0 | Status: ACTIVE | COMMUNITY
Start: 2019-12-10 | End: 1900-01-01

## 2024-03-20 RX ORDER — LEVOTHYROXINE SODIUM 0.03 MG/1
25 TABLET ORAL
Qty: 90 | Refills: 1 | Status: ACTIVE | COMMUNITY
Start: 2019-08-10 | End: 1900-01-01

## 2024-05-14 ENCOUNTER — APPOINTMENT (OUTPATIENT)
Dept: INTERNAL MEDICINE | Facility: CLINIC | Age: 79
End: 2024-05-14
Payer: MEDICARE

## 2024-05-14 ENCOUNTER — RESULT REVIEW (OUTPATIENT)
Age: 79
End: 2024-05-14

## 2024-05-14 VITALS
HEIGHT: 63 IN | OXYGEN SATURATION: 98 % | TEMPERATURE: 97.5 F | BODY MASS INDEX: 27.46 KG/M2 | DIASTOLIC BLOOD PRESSURE: 80 MMHG | SYSTOLIC BLOOD PRESSURE: 136 MMHG | HEART RATE: 101 BPM | WEIGHT: 155 LBS

## 2024-05-14 DIAGNOSIS — R06.2 WHEEZING: ICD-10-CM

## 2024-05-14 DIAGNOSIS — R05.1 ACUTE COUGH: ICD-10-CM

## 2024-05-14 DIAGNOSIS — R06.00 DYSPNEA, UNSPECIFIED: ICD-10-CM

## 2024-05-14 PROCEDURE — 99214 OFFICE O/P EST MOD 30 MIN: CPT

## 2024-05-14 PROCEDURE — G0444 DEPRESSION SCREEN ANNUAL: CPT | Mod: 59

## 2024-05-14 RX ORDER — METFORMIN HYDROCHLORIDE 500 MG/1
500 TABLET, FILM COATED, EXTENDED RELEASE ORAL DAILY
Refills: 0 | Status: DISCONTINUED | COMMUNITY
Start: 2023-11-25 | End: 2024-05-14

## 2024-05-14 NOTE — REVIEW OF SYSTEMS
[Fever] : no fever [Nasal Discharge] : nasal discharge [Sore Throat] : no sore throat [Postnasal Drip] : postnasal drip [Chest Pain] : no chest pain [Palpitations] : no palpitations [Orthopnea] : no orthopnea [Paroxysmal Nocturnal Dyspnea] : no paroxysmal nocturnal dyspnea [Shortness Of Breath] : shortness of breath [Wheezing] : wheezing [Cough] : cough [Abdominal Pain] : no abdominal pain [Diarrhea] : diarrhea [Vomiting] : no vomiting

## 2024-05-14 NOTE — HEALTH RISK ASSESSMENT
[No] : In the past 12 months have you used drugs other than those required for medical reasons? No [0] : 2) Feeling down, depressed, or hopeless: Not at all (0) [PHQ-2 Negative - No further assessment needed] : PHQ-2 Negative - No further assessment needed [Former] : Former [< 15 Years] : < 15 Years [Audit-CScore] : 0 [[id number]] : 0

## 2024-05-14 NOTE — HISTORY OF PRESENT ILLNESS
[FreeTextEntry8] : 1.5 weeks dry cough, wheeze, SOB. No fever, chills, vomiting or diarrhea. Low appetite. Normal taste and smell.  No COVID test performed.  Using Albuterol with minimal relief of cough, SOB, wheeze.

## 2024-05-15 ENCOUNTER — TRANSCRIPTION ENCOUNTER (OUTPATIENT)
Age: 79
End: 2024-05-15

## 2024-05-15 LAB
INFLUENZA A RESULT: NOT DETECTED
INFLUENZA B RESULT: NOT DETECTED
RESP SYN VIRUS RESULT: NOT DETECTED
SARS-COV-2 RESULT: NOT DETECTED

## 2024-05-16 DIAGNOSIS — R93.89 ABNORMAL FINDINGS ON DIAGNOSTIC IMAGING OF OTHER SPECIFIED BODY STRUCTURES: ICD-10-CM

## 2024-05-16 DIAGNOSIS — R22.1 LOCALIZED SWELLING, MASS AND LUMP, NECK: ICD-10-CM

## 2024-05-31 ENCOUNTER — APPOINTMENT (OUTPATIENT)
Dept: PULMONOLOGY | Facility: CLINIC | Age: 79
End: 2024-05-31

## 2024-06-03 ENCOUNTER — APPOINTMENT (OUTPATIENT)
Dept: FAMILY MEDICINE | Facility: CLINIC | Age: 79
End: 2024-06-03

## 2024-06-04 ENCOUNTER — APPOINTMENT (OUTPATIENT)
Dept: INTERNAL MEDICINE | Facility: CLINIC | Age: 79
End: 2024-06-04

## 2024-06-07 ENCOUNTER — APPOINTMENT (OUTPATIENT)
Dept: PULMONOLOGY | Facility: CLINIC | Age: 79
End: 2024-06-07

## 2024-06-10 ENCOUNTER — APPOINTMENT (OUTPATIENT)
Dept: ENDOCRINOLOGY | Facility: CLINIC | Age: 79
End: 2024-06-10

## 2024-06-13 ENCOUNTER — APPOINTMENT (OUTPATIENT)
Dept: ENDOCRINOLOGY | Facility: CLINIC | Age: 79
End: 2024-06-13
Payer: MEDICARE

## 2024-06-13 VITALS
OXYGEN SATURATION: 98 % | BODY MASS INDEX: 27.46 KG/M2 | DIASTOLIC BLOOD PRESSURE: 80 MMHG | HEIGHT: 63 IN | WEIGHT: 155 LBS | HEART RATE: 112 BPM | SYSTOLIC BLOOD PRESSURE: 138 MMHG

## 2024-06-13 DIAGNOSIS — R22.0 LOCALIZED SWELLING, MASS AND LUMP, HEAD: ICD-10-CM

## 2024-06-13 DIAGNOSIS — E74.39 OTHER DISORDERS OF INTESTINAL CARBOHYDRATE ABSORPTION: ICD-10-CM

## 2024-06-13 DIAGNOSIS — E03.9 HYPOTHYROIDISM, UNSPECIFIED: ICD-10-CM

## 2024-06-13 DIAGNOSIS — E11.9 TYPE 2 DIABETES MELLITUS W/OUT COMPLICATIONS: ICD-10-CM

## 2024-06-13 PROCEDURE — 99204 OFFICE O/P NEW MOD 45 MIN: CPT

## 2024-06-13 PROCEDURE — G2211 COMPLEX E/M VISIT ADD ON: CPT

## 2024-06-13 RX ORDER — NYSTATIN 100000 [USP'U]/G
100000 CREAM TOPICAL TWICE DAILY
Qty: 1 | Refills: 3 | Status: DISCONTINUED | COMMUNITY
Start: 2023-11-15 | End: 2024-06-13

## 2024-06-13 RX ORDER — BECLOMETHASONE DIPROPIONATE HFA 40 UG/1
40 AEROSOL, METERED RESPIRATORY (INHALATION) TWICE DAILY
Qty: 1 | Refills: 0 | Status: DISCONTINUED | COMMUNITY
Start: 2022-12-12 | End: 2024-06-13

## 2024-06-13 RX ORDER — LORAZEPAM 1 MG/1
1 TABLET ORAL
Qty: 2 | Refills: 0 | Status: DISCONTINUED | COMMUNITY
Start: 2019-11-01 | End: 2024-06-13

## 2024-06-13 NOTE — REASON FOR VISIT
[Follow - Up] : a follow-up visit [Hypothyroidism] : hypothyroidism [Other___] : [unfilled] [Source: ______] : History obtained from [facility]

## 2024-06-14 RX ORDER — DEXAMETHASONE 1 MG/1
1 TABLET ORAL
Qty: 1 | Refills: 0 | Status: ACTIVE | COMMUNITY
Start: 2024-06-14 | End: 1900-01-01

## 2024-06-15 PROBLEM — R22.0 SWELLING OF FACE: Status: ACTIVE | Noted: 2024-06-13

## 2024-06-15 PROBLEM — E74.39 GLUCOSE INTOLERANCE: Status: ACTIVE | Noted: 2024-06-13

## 2024-06-15 PROBLEM — E03.9 ACQUIRED HYPOTHYROIDISM: Status: ACTIVE | Noted: 2019-03-19

## 2024-06-15 NOTE — HISTORY OF PRESENT ILLNESS
[FreeTextEntry1] : The patient presented today for an earlier appointment then was requested due to recent development of facial swelling.  She was told that this is possibly "moon face" and she should be seen by her endocrinologist for Cushing syndrome.  DM diagnosed in 2023, uncomplicated Patient has been following lifestyle modifications for diabetes mellitus since her diagnosis. Not taking the metformin prescribed by prior endocrinologist due to GI intolerance. Therapies tried: Metformin Last HgA1c in February 2024 was 6.4%, previously in November 2023 was 6.3% Checks BS every morning; maximum blood glucose of 160 mg/dL Hypoglycemia: Denies   Microvascular complications Last albumin/creatinine ratio: February 2024; normal Neuropathy symptoms: Yes, but due to CIDP Microfilament exam: Abnormal; January 2024 Retinopathy: Denies Last eye exam: Due this year   Macrovascular complications CAD/CVA/PVD: Denies   HTN: Yes, currently taking metoprolol 50 mg daily LDL at goal ( < 70 mg/dL) while taking pravastatin 40 mg daily   Diet: Mindful Exercise: Limited by CIDP Immunizations: Up-to-date Family DM History: Denies  Osteopenia diagnosed by DEXA scan Last DEXA scan: April 2023; worst T-score -2.4 in the left femoral neck, -1.8 in left hip, -1.7 in the spine History of fractures: Denies Age at menopause: Early 50s Height loss: Denies History of hypercalcemia: Denies History of kidney stones: Denies Problems with balance or vision: Yes, difficulty ambulating due to CIDP History of falls: Denies Smoking history: Quit > 40 years ago Prior glucocorticoid use: Yes, received 750 mg IV methylprednisolone twice weekly for 6 months Prior anti-epileptic medications: Denies Prior chemotherapy: Denies Prior radiation therapy: Yes, left breast cancer Heartburn or reflux symptoms: Denies Family history of fractures, osteoporosis, or hyperparathyroidism: Grandmother and mother had multiple fractures. Calcium and Vitamin D through diet ad supplementation: Vitamin D supplement Previous osteoporosis treatments: No

## 2024-06-15 NOTE — PHYSICAL EXAM
[Alert] : alert [Well Nourished] : well nourished [No Acute Distress] : no acute distress [Well Developed] : well developed [Normal Voice/Communication] : normal voice communication [EOMI] : extra ocular movement intact [No Proptosis] : no proptosis [Normal Hearing] : hearing was normal [No Respiratory Distress] : no respiratory distress [Normal Rate and Effort] : normal respiratory rate and effort [Normal Rate] : heart rate was normal [Not Distended] : not distended [No Stigmata of Cushings Syndrome] : no stigmata of Cushings Syndrome [No Rash] : no rash [Abdominal Striae] : no abdominal striae [Acanthosis Nigricans] : no acanthosis nigricans [Foot Ulcers] : no foot ulcers [Hirsutism] : no hirsutism [Oriented x3] : oriented to person, place, and time [Normal Affect] : the affect was normal [Recent Memory Normal] : recent memory was not impaired [Normal Insight/Judgement] : insight and judgment were intact [Normal Mood] : the mood was normal [Remote Memory Normal] : remote memory was not impaired [de-identified] : Mildly rounded face. [de-identified] : Venous stasis changes of her legs bilaterally.  Decreased pedal pulses bilaterally.  Cold feet bilaterally. [de-identified] : No dorsocervical adiposity.  [de-identified] : Ambulating with cane

## 2024-06-15 NOTE — REVIEW OF SYSTEMS
[Fatigue] : no fatigue [Recent Weight Gain (___ Lbs)] : no recent weight gain [Recent Weight Loss (___ Lbs)] : no recent weight loss [Chest Pain] : no chest pain [Shortness Of Breath] : no shortness of breath [Polyuria] : no polyuria [Joint Pain] : joint pain [Pain/Numbness of Digits] : pain/numbness of digits [Polydipsia] : no polydipsia [Easy Bruising] : no tendency for easy bruising [Swelling] : no swelling [All other systems negative] : All other systems negative

## 2024-06-27 ENCOUNTER — APPOINTMENT (OUTPATIENT)
Dept: PULMONOLOGY | Facility: CLINIC | Age: 79
End: 2024-06-27

## 2024-06-30 ENCOUNTER — RESULT REVIEW (OUTPATIENT)
Age: 79
End: 2024-06-30

## 2024-07-01 ENCOUNTER — RESULT REVIEW (OUTPATIENT)
Age: 79
End: 2024-07-01

## 2024-07-07 ENCOUNTER — RESULT REVIEW (OUTPATIENT)
Age: 79
End: 2024-07-07

## 2024-07-11 ENCOUNTER — TRANSCRIPTION ENCOUNTER (OUTPATIENT)
Age: 79
End: 2024-07-11

## 2024-07-12 ENCOUNTER — TRANSCRIPTION ENCOUNTER (OUTPATIENT)
Age: 79
End: 2024-07-12

## 2024-07-16 ENCOUNTER — NON-APPOINTMENT (OUTPATIENT)
Age: 79
End: 2024-07-16

## 2024-07-18 PROBLEM — C34.90 LUNG CANCER: Status: ACTIVE | Noted: 2024-07-18

## 2024-07-18 NOTE — HISTORY OF PRESENT ILLNESS
[de-identified] : Ms. Danya Iverson is 78 years old female with non-small cell lung cancer of lung here for consultation  Patient with hx of HLD, A fib (on Eliquis), hypothyroidism,  chronic inflammatory demyelinating peripheral syndrome, Guillain-[place], following pneumonia in 2017, last time high dose steroids 2023,  stage I breast cancer ER/TX pos, HER2 neg , status post lumpectomy 11 years ago, 2013, didn't tolerate endocrine therapy, Cushing's syndrome who hospitalized at [facility] from 6/29/2024 - 7/11/2024 for shortnes of breath x 2 months with X ray noted for right pleural effusion and  weight loss, and fatigue.  Imaging and hospital courses as below:   5/26/2024  RIGHT BREAST UPPER OUTER QUADRANT CALCIFICATIONS, STEREOTACTIC BIOPSY: - Focal atypical lobular hyperplasia (ALH).  - Breast tissue showing mild usual ductal hyperplasia, cysts and columnar cell change/hyperplasia with scattered microcalcifications.   6/29/2024 CTA No pulmonary embolism. Complete opacification of right-sided bronchi. Large right pleural effusion with complete collapse of left lung is clear. Right brachiocephalic and SVC stenosis/occlusion with associated collaterals. Mediastinal and supraclavicular adenopathy. Partially imaged right adrenal 1.7 cm nodule  7/1/2024 Doppler - neg   7/2/2024  RIGHT PLEURAL FLUID, CYTOLOGY: -  SUSPICIOUS FOR MALIGNANCY -  Scanty evidence of highly atypical cells, suspicious for poorly differentiated carcinoma. See comments  7/6/2024 CT C/A/P LUNGS AND LARGE AIRWAYS: Large central right lung mass with mediastinal extension and encasing/narrowing the right mainstem bronchus and obliterating the right upper lobe bronchus (see "mediastinum and hilar" section below). Surrounding postobstructive consolidative changes. Patchy opacification, groundglass attenuation and nodularity throughout the right lung may reflect postobstructive pneumonitis or superimposed infection. Bandlike areas of residual atelectasis in the right lung and scattered areas of atelectasis in the contralateral lung. No suspicious nodules in the contralateral lung. PLEURA: Right-sided chest tube has its tip in the right basilar pleural space. Right hydropneumothorax with small air component and mild to moderate fluid component. Volume of fluid significantly decreased since 06/29/2024 VESSELS: Right internal jugular vein is not visualized, likely occluded. Partial thrombosis of the right brachiocephalic vein and minimal thrombus in the lower left brachiocephalic vein. Tumor encasement of the SVC resulting in severe stenosis, possibility of partially occlusive luminal thrombus is not excluded. Tumor encasement resulting in marked narrowing of the right main pulmonary artery and its lobar branches as well as the right superior pulmonary vein. HEART: Heart size is normal. No pericardial effusion. MEDIASTINUM AND GARLAND:  Large central right lung mass with mediastinal invasion. Reference measurement of 9.2 x 7.2 cm on series 3, image 43 which may include surrounding postobstructive consolidation. Tumor encasement causing severe narrowing of the right mainstem bronchus and obliteration of the right upper lobe bronchus. Vascular encasement as detailed above. Coalescent necrotic right upper paratracheal and prevascular lymph nodes. For reference, 2.2 x 1.7 cm prevascular node (3, 26) and 2.7 x 2.6 cm paratracheal node (3, 30). Few droplets of air presumably related to recent right-sided chest tube placement. CHEST WALL AND LOWER NECK: Multiple right-sided necrotic supraclavicular lymph nodes, see dedicated CT of the neck for detailed discussion. ABDOMEN AND PELVIS: LIVER: Small cyst in segment IVb. BILE DUCTS: Normal caliber. GALLBLADDER: Within normal limits. SPLEEN: 1.5 cm cyst. Few additional subcentimeter scattered low attenuating foci. PANCREAS: Within normal limits. ADRENALS: 4.3 AP x 2.8 TV x 6.4 CC cm necrotic right adrenal metastasis. IMPRESSION:  1.  Large central right lung mass with mediastinal extension causing narrowing of the right mainstem bronchus, obliteration of the right upper lobe bronchus, and vascular encasement. See detailed description above. 2.  Right internal jugular vein likely occluded. Tumor encasement causes severe stenosis of the SVC with or without luminal thrombus.  3.  Partial thrombosis of the right brachiocephalic vein and minimal thrombus in the lower left brachiocephalic vein.  4.  Mediastinal and right-sided supraclavicular lymphadenopathy. 5.  Large right adrenal metastasis. 6.  Decreased size of the previously noted right-sided pleural effusion with residual hydropneumothorax, small air component and mild to moderate fluid component. 7.  Patchy opacities, nodularity and groundglass attenuation throughout the right lung, likely related to postobstructive pneumonitis or superimposed infection.  s/p R chest tube placement and removal, Supraclavicular lymph node biopsy, SVC stent  7/8/2024 RIGHT SUPRACLAVICULAR LYMPH NODE, CORE BIOPSY: - Poorly differentiated carcinoma with extensive necrosis, favor non-small cell carcinoma of the lung - No normal lymph node tissue seen DIAGNOSIS COMMENT   The tumor shows diffuse pattern, large cells with prominent nucleoli and abundant cytoplasm. Mitosis is abundant. Background geographic necrosis is noted. Immunostains on block A2 show that the neoplastic cells are panCK+ CK7+ MOC31+ calretinin(focal)+ CK20- TTF1- NapsinA- CK5/6- p40- p63- GATA3- ER- PAX8- CDX2- WT1- CD5- -. Mucicarmine stain is negative.

## 2024-07-25 ENCOUNTER — APPOINTMENT (OUTPATIENT)
Dept: HEMATOLOGY ONCOLOGY | Facility: CLINIC | Age: 79
End: 2024-07-25

## 2024-07-25 DIAGNOSIS — C34.90 MALIGNANT NEOPLASM OF UNSPECIFIED PART OF UNSPECIFIED BRONCHUS OR LUNG: ICD-10-CM

## 2024-07-26 ENCOUNTER — APPOINTMENT (OUTPATIENT)
Dept: GASTROENTEROLOGY | Facility: HOSPITAL | Age: 79
End: 2024-07-26

## 2024-07-26 ENCOUNTER — TRANSCRIPTION ENCOUNTER (OUTPATIENT)
Age: 79
End: 2024-07-26

## 2024-07-26 ENCOUNTER — APPOINTMENT (OUTPATIENT)
Dept: PULMONOLOGY | Facility: CLINIC | Age: 79
End: 2024-07-26

## 2024-07-29 ENCOUNTER — RESULT REVIEW (OUTPATIENT)
Age: 79
End: 2024-07-29

## 2024-08-07 ENCOUNTER — TRANSCRIPTION ENCOUNTER (OUTPATIENT)
Age: 79
End: 2024-08-07

## 2024-08-22 ENCOUNTER — APPOINTMENT (OUTPATIENT)
Dept: INTERNAL MEDICINE | Facility: CLINIC | Age: 79
End: 2024-08-22

## 2024-10-15 ENCOUNTER — APPOINTMENT (OUTPATIENT)
Dept: ENDOCRINOLOGY | Facility: CLINIC | Age: 79
End: 2024-10-15